# Patient Record
Sex: MALE | Race: WHITE | HISPANIC OR LATINO | URBAN - METROPOLITAN AREA
[De-identification: names, ages, dates, MRNs, and addresses within clinical notes are randomized per-mention and may not be internally consistent; named-entity substitution may affect disease eponyms.]

---

## 2024-02-05 ENCOUNTER — APPOINTMENT (RX ONLY)
Dept: URBAN - METROPOLITAN AREA CLINIC 49 | Facility: CLINIC | Age: 54
Setting detail: DERMATOLOGY
End: 2024-02-05

## 2024-02-05 DIAGNOSIS — L82.1 OTHER SEBORRHEIC KERATOSIS: ICD-10-CM

## 2024-02-05 DIAGNOSIS — L73.8 OTHER SPECIFIED FOLLICULAR DISORDERS: ICD-10-CM

## 2024-02-05 PROCEDURE — ? FULL BODY SKIN EXAM - DECLINED

## 2024-02-05 PROCEDURE — ? ELECTRODESICCATION (COSMETIC)

## 2024-02-05 PROCEDURE — ? COUNSELING

## 2024-02-05 ASSESSMENT — LOCATION DETAILED DESCRIPTION DERM
LOCATION DETAILED: LEFT SUPERIOR LATERAL MALAR CHEEK
LOCATION DETAILED: LEFT INFERIOR CENTRAL MALAR CHEEK
LOCATION DETAILED: LEFT LATERAL MALAR CHEEK
LOCATION DETAILED: LEFT CENTRAL MALAR CHEEK

## 2024-02-05 ASSESSMENT — LOCATION ZONE DERM: LOCATION ZONE: FACE

## 2024-02-05 ASSESSMENT — LOCATION SIMPLE DESCRIPTION DERM: LOCATION SIMPLE: LEFT CHEEK

## 2024-02-05 NOTE — PROCEDURE: ELECTRODESICCATION (COSMETIC)
Consent: The patient's consent was obtained including but not limited to risks of crusting, scabbing, blistering, scarring, darker or lighter pigmentary change, recurrence, incomplete removal and infection.
Palacios: 1
Price (Use Numbers Only, No Special Characters Or $): 150
Anesthesia Volume In Cc: 0.5
Detail Level: Zone
Anesthesia Type: 1% lidocaine without epinephrine
Post-Care Instructions: I reviewed with the patient in detail post-care instructions. Patient is to wear sunprotection, and avoid picking at any of the treated lesions. Pt may apply Vaseline to crusted or scabbing areas
Palacios: 2

## 2024-02-19 ENCOUNTER — APPOINTMENT (RX ONLY)
Dept: URBAN - METROPOLITAN AREA CLINIC 49 | Facility: CLINIC | Age: 54
Setting detail: DERMATOLOGY
End: 2024-02-19

## 2024-02-19 DIAGNOSIS — L73.8 OTHER SPECIFIED FOLLICULAR DISORDERS: ICD-10-CM

## 2024-02-19 DIAGNOSIS — L57.8 OTHER SKIN CHANGES DUE TO CHRONIC EXPOSURE TO NONIONIZING RADIATION: ICD-10-CM

## 2024-02-19 DIAGNOSIS — L82.1 OTHER SEBORRHEIC KERATOSIS: ICD-10-CM

## 2024-02-19 PROCEDURE — ? FULL BODY SKIN EXAM - DECLINED

## 2024-02-19 PROCEDURE — ? PRESCRIPTION MEDICATION MANAGEMENT

## 2024-02-19 PROCEDURE — ? LIQUID NITROGEN (COSMETIC)

## 2024-02-19 PROCEDURE — 99212 OFFICE O/P EST SF 10 MIN: CPT

## 2024-02-19 PROCEDURE — ? ELECTRODESICCATION (COSMETIC)

## 2024-02-19 PROCEDURE — ? COUNSELING

## 2024-02-19 PROCEDURE — ? PRESCRIPTION

## 2024-02-19 RX ORDER — TRETIONIN 0.5 MG/G
CREAM TOPICAL
Qty: 20 | Refills: 1 | Status: ERX | COMMUNITY
Start: 2024-02-19

## 2024-02-19 RX ADMIN — TRETIONIN: 0.5 CREAM TOPICAL at 00:00

## 2024-02-19 ASSESSMENT — LOCATION ZONE DERM
LOCATION ZONE: SCALP
LOCATION ZONE: FACE

## 2024-02-19 ASSESSMENT — LOCATION SIMPLE DESCRIPTION DERM
LOCATION SIMPLE: INFERIOR FOREHEAD
LOCATION SIMPLE: RIGHT FOREHEAD
LOCATION SIMPLE: LEFT CHEEK
LOCATION SIMPLE: LEFT FOREHEAD
LOCATION SIMPLE: SCALP
LOCATION SIMPLE: RIGHT CHEEK
LOCATION SIMPLE: RIGHT ZYGOMA

## 2024-02-19 ASSESSMENT — LOCATION DETAILED DESCRIPTION DERM
LOCATION DETAILED: RIGHT SUPERIOR MEDIAL FOREHEAD
LOCATION DETAILED: LEFT SUPERIOR FOREHEAD
LOCATION DETAILED: LEFT INFERIOR POSTAURICULAR SKIN
LOCATION DETAILED: RIGHT CENTRAL MALAR CHEEK
LOCATION DETAILED: LEFT INFERIOR MEDIAL MALAR CHEEK
LOCATION DETAILED: RIGHT MEDIAL FOREHEAD
LOCATION DETAILED: RIGHT SUPERIOR LATERAL MALAR CHEEK
LOCATION DETAILED: LEFT CENTRAL MALAR CHEEK
LOCATION DETAILED: RIGHT CENTRAL ZYGOMA
LOCATION DETAILED: LEFT INFERIOR CENTRAL MALAR CHEEK
LOCATION DETAILED: RIGHT LATERAL ZYGOMA
LOCATION DETAILED: LEFT LATERAL MANDIBULAR CHEEK
LOCATION DETAILED: INFERIOR MID FOREHEAD

## 2024-02-19 NOTE — PROCEDURE: ELECTRODESICCATION (COSMETIC)
Palacios: 2
Detail Level: Zone
Price (Use Numbers Only, No Special Characters Or $): 100
Consent: The patient's consent was obtained including but not limited to risks of crusting, scabbing, blistering, scarring, darker or lighter pigmentary change, recurrence, incomplete removal and infection.
Anesthesia Volume In Cc: 1
Anesthesia Type: 1% lidocaine without epinephrine
Post-Care Instructions: I reviewed with the patient in detail post-care instructions. Patient is to wear sunprotection, and avoid picking at any of the treated lesions. Pt may apply Vaseline to crusted or scabbing areas

## 2024-02-19 NOTE — PROCEDURE: LIQUID NITROGEN (COSMETIC)
Billing Information: Bill by Static Price
Render Post-Care Instructions In Note?: no
Detail Level: Detailed
Spray Paint Text: The liquid nitrogen was applied to the skin utilizing a spray paint frosting technique.
Price (Use Numbers Only, No Special Characters Or $): 50
Show Spray Paint Technique Variable?: Yes
Post-Care Instructions: I reviewed with the patient in detail post-care instructions. Patient is to wear sunprotection, and avoid picking at any of the treated lesions. Pt may apply Vaseline to crusted or scabbing areas.
Consent: The patient's consent was obtained including but not limited to risks of crusting, scabbing, blistering, scarring, darker or lighter pigmentary change, recurrence, incomplete removal and infection. The patient understands that the procedure is cosmetic in nature and is not covered by insurance.

## 2024-02-19 NOTE — PROCEDURE: PRESCRIPTION MEDICATION MANAGEMENT
Render In Strict Bullet Format?: No
Initiate Treatment: tretinoin 0.05 % topical cream\\nSig: Apply pea-sized amount to entire face every other night and increase to QHS, as tolerated.
Detail Level: Zone

## 2024-06-17 ENCOUNTER — APPOINTMENT (RX ONLY)
Dept: URBAN - METROPOLITAN AREA CLINIC 49 | Facility: CLINIC | Age: 54
Setting detail: DERMATOLOGY
End: 2024-06-17

## 2024-06-17 DIAGNOSIS — L73.8 OTHER SPECIFIED FOLLICULAR DISORDERS: ICD-10-CM

## 2024-06-17 DIAGNOSIS — L57.8 OTHER SKIN CHANGES DUE TO CHRONIC EXPOSURE TO NONIONIZING RADIATION: ICD-10-CM

## 2024-06-17 DIAGNOSIS — L82.1 OTHER SEBORRHEIC KERATOSIS: ICD-10-CM

## 2024-06-17 PROCEDURE — ? PRESCRIPTION MEDICATION MANAGEMENT

## 2024-06-17 PROCEDURE — ? MDM - TREATMENT GOALS

## 2024-06-17 PROCEDURE — ? DEFER

## 2024-06-17 PROCEDURE — ? PRESCRIPTION

## 2024-06-17 PROCEDURE — 99213 OFFICE O/P EST LOW 20 MIN: CPT

## 2024-06-17 PROCEDURE — ? LIQUID NITROGEN (COSMETIC)

## 2024-06-17 PROCEDURE — ? COUNSELING

## 2024-06-17 PROCEDURE — ? FULL BODY SKIN EXAM - DECLINED

## 2024-06-17 RX ORDER — TRETIONIN 1 MG/G
CREAM TOPICAL
Qty: 45 | Refills: 1 | Status: ERX | COMMUNITY
Start: 2024-06-17

## 2024-06-17 RX ADMIN — TRETIONIN: 1 CREAM TOPICAL at 00:00

## 2024-06-17 ASSESSMENT — LOCATION DETAILED DESCRIPTION DERM
LOCATION DETAILED: LEFT SUPERIOR CENTRAL MALAR CHEEK
LOCATION DETAILED: LEFT CENTRAL TEMPLE
LOCATION DETAILED: RIGHT MEDIAL ZYGOMA
LOCATION DETAILED: INFERIOR MID FOREHEAD
LOCATION DETAILED: SUPERIOR MID FOREHEAD
LOCATION DETAILED: LEFT MEDIAL ZYGOMA
LOCATION DETAILED: LEFT INFERIOR CENTRAL MALAR CHEEK
LOCATION DETAILED: LEFT SUPERIOR MEDIAL MALAR CHEEK
LOCATION DETAILED: LEFT CENTRAL MALAR CHEEK
LOCATION DETAILED: LEFT LATERAL MANDIBULAR CHEEK
LOCATION DETAILED: LEFT MEDIAL TEMPLE
LOCATION DETAILED: RIGHT SUPERIOR MEDIAL MALAR CHEEK
LOCATION DETAILED: RIGHT CENTRAL ZYGOMA
LOCATION DETAILED: RIGHT INFERIOR CENTRAL MALAR CHEEK
LOCATION DETAILED: LEFT INFERIOR POSTAURICULAR SKIN
LOCATION DETAILED: RIGHT CENTRAL MALAR CHEEK

## 2024-06-17 ASSESSMENT — LOCATION SIMPLE DESCRIPTION DERM
LOCATION SIMPLE: RIGHT CHEEK
LOCATION SIMPLE: LEFT TEMPLE
LOCATION SIMPLE: SUPERIOR FOREHEAD
LOCATION SIMPLE: RIGHT ZYGOMA
LOCATION SIMPLE: LEFT ZYGOMA
LOCATION SIMPLE: SCALP
LOCATION SIMPLE: LEFT CHEEK
LOCATION SIMPLE: INFERIOR FOREHEAD

## 2024-06-17 ASSESSMENT — LOCATION ZONE DERM
LOCATION ZONE: SCALP
LOCATION ZONE: FACE

## 2024-06-17 NOTE — PROCEDURE: DEFER
X Size Of Lesion In Cm (Optional): 0
Detail Level: Detailed
Introduction Text (Please End With A Colon): The following procedure was deferred:
Procedure To Be Performed At Next Visit: Electrodesiccation (Cosmetic)

## 2024-06-17 NOTE — PROCEDURE: PRESCRIPTION MEDICATION MANAGEMENT
Render In Strict Bullet Format?: No
Modify Regimen: increase from tretinoin 0.05% to 0.1% QHS to face
Detail Level: Zone

## 2024-06-17 NOTE — PROCEDURE: LIQUID NITROGEN (COSMETIC)
Billing Information: Bill by Static Price
Render Post-Care Instructions In Note?: no
Detail Level: Detailed
Spray Paint Text: The liquid nitrogen was applied to the skin utilizing a spray paint frosting technique.
Price (Use Numbers Only, No Special Characters Or $): 150
Show Spray Paint Technique Variable?: Yes
Post-Care Instructions: I reviewed with the patient in detail post-care instructions. Patient is to wear sunprotection, and avoid picking at any of the treated lesions. Pt may apply Vaseline to crusted or scabbing areas.
Consent: The patient's consent was obtained including but not limited to risks of crusting, scabbing, blistering, scarring, darker or lighter pigmentary change, recurrence, incomplete removal and infection. The patient understands that the procedure is cosmetic in nature and is not covered by insurance.